# Patient Record
(demographics unavailable — no encounter records)

---

## 2025-06-04 NOTE — PHYSICAL EXAM
[Urethral Meatus] : meatus normal [Penis Abnormality] : normal circumcised penis [Scrotum] : the scrotum was normal [Epididymis] : the epididymides were normal [Testes Tenderness] : no tenderness of the testes [Testes Mass (___cm)] : there were no testicular masses [de-identified] : palpable tender plaque ventral mid shaft

## 2025-06-04 NOTE — HISTORY OF PRESENT ILLNESS
[FreeTextEntry1] : 55M smoking - 35 drinks/week no eye opener +snap during intercourse  7-8 weeks ago no automatic detumescence +?eggplant vs superficial bruising was seen in ER and told no need for mgmt now notes increasing curvature able to achieve erection but curved  difficulty with intercourse decreasing rgidity noted now 7-8/10

## 2025-06-04 NOTE — ASSESSMENT
[FreeTextEntry1] : penile curvuatre ?fracture vs peyroneis story not 100% consistent with fracture reviewed options PDUS and curve assessment quit smoking consider penile MRI